# Patient Record
Sex: FEMALE | Race: BLACK OR AFRICAN AMERICAN | HISPANIC OR LATINO
[De-identification: names, ages, dates, MRNs, and addresses within clinical notes are randomized per-mention and may not be internally consistent; named-entity substitution may affect disease eponyms.]

---

## 2022-11-11 ENCOUNTER — TRANSCRIPTION ENCOUNTER (OUTPATIENT)
Age: 67
End: 2022-11-11

## 2022-11-11 ENCOUNTER — APPOINTMENT (OUTPATIENT)
Dept: AFTER HOURS CARE | Facility: EMERGENCY ROOM | Age: 67
End: 2022-11-11

## 2022-11-11 DIAGNOSIS — Z87.19 PERSONAL HISTORY OF OTHER DISEASES OF THE DIGESTIVE SYSTEM: ICD-10-CM

## 2022-11-11 DIAGNOSIS — Z86.39 PERSONAL HISTORY OF OTHER ENDOCRINE, NUTRITIONAL AND METABOLIC DISEASE: ICD-10-CM

## 2022-11-11 DIAGNOSIS — Z86.79 PERSONAL HISTORY OF OTHER DISEASES OF THE CIRCULATORY SYSTEM: ICD-10-CM

## 2022-11-11 DIAGNOSIS — Z98.61 CORONARY ANGIOPLASTY STATUS: ICD-10-CM

## 2022-11-11 DIAGNOSIS — Z87.898 PERSONAL HISTORY OF OTHER SPECIFIED CONDITIONS: ICD-10-CM

## 2022-11-11 DIAGNOSIS — U07.1 COVID-19: ICD-10-CM

## 2022-11-11 PROCEDURE — 99204 OFFICE O/P NEW MOD 45 MIN: CPT | Mod: 95

## 2022-11-11 RX ORDER — ACETAMINOPHEN 500 MG/1
500 CAPSULE, LIQUID FILLED ORAL 4 TIMES DAILY
Qty: 30 | Refills: 0 | Status: ACTIVE | COMMUNITY
Start: 2022-11-11 | End: 1900-01-01

## 2022-11-11 NOTE — PLAN
[With new medications prescribed] : Treat in place: with new medications prescribed [FreeTextEntry1] : COVID-19\par \par You were evaluated during your Telehealth visit for symptoms of the COVID-19 virus. A enrollment for the monoclonal antibody infusion, however, due to the short supply we cannot guarantee [facility] will be able to offer you an infusion at this time. [facility] representative will call you to schedule your infusion if available. You may take Tylenol or Motrin over-the-counter per package instructions as needed for fever or pain. Please seek medical attention if you develop worsening shortness of breath, vomiting, or you have any concerns.\par \par Information from [facility] on managing COVID-19 Symptoms:\par https://www.cdc.gov/coronavirus/2019-ncov/if-you-are-sick/steps-when-sick.html\par \par Information from [facility] on quarantine and isolation:\par https://www.cdc.gov/coronavirus/2019-ncov/your-health/quarantine-isolation.html?CDC_AA_refVal=https%3A%2F%2Fwww.cdc.gov%2Fcoronavirus%1A0818-pxid%2Fif-you-are-sick%2Fquarantine.html\par \par \par Phone number to antibody infusion scheduling center: [phone] ([address] [place] [address]) \par \par A prescription for Albuterol (an inhaler for cough) was sent to:\par \par \par A physician evaluated you to see if you are a candidate for the monoclonal antibody infusion today. [facility] uses very strict criteria to best determine who would receive the most benefit from monoclonal antibody infusion. According to your medical history and the information provided, you qualify for the monoclonal antibody infusion. A referral for the monoclonal antibody was placed. We recommend that you call the infusion team at [phone] ([phone]) to expedite scheduling. If you are unable to make an appointment for the infusion, please call us back at [phone] and we will help assist you.?-- YOU MUST BRING PROOF OF YOUR POSITIVE TEST TO THE INFUSION APPOINTMENT.?-- We recommend you follow the CDC recommendations for isolation?(https://www.cdc.gov/coronavirus/2019-ncov/your-health/quarantine-isolation.html).?-- Please use 650mg Tylenol (also called acetaminophen) every 4 hours & 600mg Motrin (also called Advil or ibuprofen) every 6 hours as needed for pain/discomfort/swelling. You can get these without a prescription. Don't use more than 3000mg of Tylenol in any 24-hour period. Make sure your other prescription/over-the-counter medications don't contain any Tylenol so you don't take too much. If you have any stomach discomfort while taking Motrin, you can use TUMS or Pepcid or Zantac (these can also be bought without a prescription).?-- Please  a fingertip pulse-ox detector. If the number is consistently < 94%, go to the ER.?-- Rest, increase your fluid intake and avoid dehydration & refrain from alcohol in excess.?-- It is very important to be diligent about hand washing & hygiene to avoid spreading the illness to others.?-- Please follow up with your doctor(s) within the next 2 days, but seek medical care sooner if your symptoms do not improve. Call for an appointment as soon as possible.?-- If you have worsening or concerning symptoms, especially shortness of breath or dehydration, see your doctor right away or go to the Emergency Department immediately.?-- Please continue taking your home medications as directed. Do not take extra doses of medication to make up for missed doses. Don't use alcohol when taking any medication (especially antibiotics, Tylenol or pain medication) unless you check with a doctor/pharmacist.?-- As soon as you recover and have no symptoms, please get a flu shot and complete the COVID vaccine series (if you get the monoclonal antibody infusion, wait 90 days before getting a COVID vaccine or booster shot).?-- Thanks for using the [facility] ([facility]). You can always call [facility] back at?[phone]. There is always an emergency physician available.

## 2022-11-11 NOTE — HISTORY OF PRESENT ILLNESS
[Home] : at home, [unfilled] , at the time of the visit. [Other Location: e.g. Home (Enter Location, City,State)___] : at [unfilled] [Verbal consent obtained from patient] : the patient, [unfilled] [FreeTextEntry8] : 62F presents COVID day #3, positive last night\par Sx: nasal congestion, fever, general headache, body aches, mild cough occasional\par No SOB. No N/V\par Vaccine status: COVID x4 (10/23/22) Pfizer\par PMH: Asthma, HTN, DM, CAD w/ stents, PAD, chron's, vertigo\par Medications: ASA, mesalamine, atorvastatin, brillanta, carvedilol, duloxetine, Humira, tentrum/novolog, latanoprost, losartan, pantoprozaole, riboflavine, Trulicity, vitamine D\par Allergies: NKDA, sensitive to Oxycodone/Tramdol/Percocet\par \par ([id number] [employer])\par

## 2022-11-11 NOTE — ASSESSMENT
[FreeTextEntry1] : COVID day #3. No respiratory distress. Tolerating PO. Significant risk factors for severe disease. Paxlovid contraindicated due to [place] which cannot be held due to coronary stents. Pt referred for MAB. Supportive care with Tylenol PRN and Mucinex discussed.[facility]). You can always call [facility] back at?[phone]. There is always an emergency physician available.

## 2022-11-14 ENCOUNTER — APPOINTMENT (OUTPATIENT)
Age: 67
End: 2022-11-14

## 2022-11-14 ENCOUNTER — EMERGENCY (EMERGENCY)
Facility: HOSPITAL | Age: 67
LOS: 1 days | Discharge: HOME | End: 2022-11-14
Admitting: EMERGENCY MEDICINE
Payer: MEDICARE

## 2022-11-14 VITALS
RESPIRATION RATE: 17 BRPM | DIASTOLIC BLOOD PRESSURE: 78 MMHG | OXYGEN SATURATION: 98 % | SYSTOLIC BLOOD PRESSURE: 149 MMHG | HEART RATE: 73 BPM | TEMPERATURE: 99 F

## 2022-11-14 VITALS — HEIGHT: 64 IN | WEIGHT: 166.01 LBS

## 2022-11-14 PROCEDURE — L9998: CPT

## 2022-11-14 RX ORDER — BEBTELOVIMAB 87.5 MG/ML
175 INJECTION, SOLUTION INTRAVENOUS ONCE
Refills: 0 | Status: COMPLETED | OUTPATIENT
Start: 2022-11-14 | End: 2022-11-14

## 2022-11-14 RX ADMIN — BEBTELOVIMAB 175 MILLIGRAM(S): 87.5 INJECTION, SOLUTION INTRAVENOUS at 09:15

## 2022-11-14 NOTE — CHART NOTE - NSCHARTNOTEFT_GEN_A_CORE
Monoclonal Antibody Infusion    Symptoms: congestion, cough, fever  High Risk Factors: age greater than 65   Allergies: tramadol-vomiting rash    Home Medications:   aspirin 81mg  atorvastatin 40mg daily   losartan 100mg daily   Brilinta 60mg daily   carvedilol 12.5mg daily   duloxetine 30mg   novalog insulin       Exam:   Appearance: NAD  HEENt: Normal oral mucosa  Lymphatic: No lymphadenopathy  Cardiovascular: Normal S1 S2, no acute cardiac distress noted  Respiratory: Lungs clear to auscultation   Gastrointestinal : soft, non-tender, + BS  Skin: warm and dry   Neurological: non-focal   Extremities: Normal range of motion    Vital Signs:   BP: 164/78, 153/83  Temp: 99.0  02 sat: 98  HR: 72    Assessment & Plan:   I have reviewed the Bebtelovimab infusion Emergency Use Authorization and I have provided the patients or the patient's caregiver with the following information:   1.) FDA has authorized emergency use Bebtelovimab, which is not an FDA-approved biological product  2.)The patients or patient's caregiver has the option to accept or refuse administration of Bebtelovimab  3.) The significant known and potential risks and benefits of Bebtelovimab and the extent to which such risks and benefits are unknown  4.) Information on available alternative  treatments and risks and benefits of those alternatives    67 years old    Allergies: tramadol    Pt tested positive for COVID-19 on 11/9/22    Symptoms=congestion, cough, fever    COVID-19 Vaccine=Pfizer x4    PMH= HTN, diabetes, anxiety    PLAN:   Infusion procedure explained to patient-consent for monoclonal antibody infusion obtained- risks and benefits discussed/all questions answered -infuse Bebtelovimab IV 1x IV push-observe patient for one hour post infusion and discharge home      Consent form thoroughly reviewed and signed     All questions answered    Pt is okay with plan    Pt tolerated MAB infusion well    No acute distress, noted    Pt advised to follow-up with PCP soon    ER percautions if symptoms worsen Monoclonal Antibody Infusion    Symptoms: congestion, cough, fever. Pt denies any CP, SOB.   High Risk Factors: age greater than 65   Allergies: tramadol-vomiting rash    Home Medications:   aspirin 81mg  atorvastatin 40mg daily   losartan 100mg daily   Brilinta 60mg daily   carvedilol 12.5mg daily   duloxetine 30mg   novalog insulin       Exam:   Appearance: NAD  HEENt: Normal oral mucosa  Lymphatic: No lymphadenopathy  Cardiovascular: Normal S1 S2, no acute cardiac distress noted  Respiratory: Lungs clear to auscultation   Gastrointestinal : soft, non-tender, + BS  Skin: warm and dry   Neurological: non-focal   Extremities: Normal range of motion    Vital Signs:   BP: 164/78, 153/83  Temp: 99.0  02 sat: 98  HR: 72    Assessment & Plan:   I have reviewed the Bebtelovimab infusion Emergency Use Authorization and I have provided the patients or the patient's caregiver with the following information:   1.) FDA has authorized emergency use Bebtelovimab, which is not an FDA-approved biological product  2.)The patients or patient's caregiver has the option to accept or refuse administration of Bebtelovimab  3.) The significant known and potential risks and benefits of Bebtelovimab and the extent to which such risks and benefits are unknown  4.) Information on available alternative  treatments and risks and benefits of those alternatives    67 years old    Allergies: tramadol    Pt tested positive for COVID-19 on 11/9/22    Symptoms=congestion, cough, fever    COVID-19 Vaccine=Pfizer x4    PMH= HTN, diabetes, anxiety    PLAN:   Infusion procedure explained to patient-consent for monoclonal antibody infusion obtained- risks and benefits discussed/all questions answered -infuse Bebtelovimab IV 1x IV push-observe patient for one hour post infusion and discharge home. Pt also instructed to f/u w/ PCP for BP check.       Consent form thoroughly reviewed and signed     All questions answered    Pt is okay with plan    Pt tolerated MAB infusion well    No acute distress, noted    Pt advised to follow-up with PCP soon    ER precautions if symptoms worsen

## 2022-11-16 DIAGNOSIS — U07.1 COVID-19: ICD-10-CM

## 2022-11-16 DIAGNOSIS — F41.9 ANXIETY DISORDER, UNSPECIFIED: ICD-10-CM

## 2022-11-16 DIAGNOSIS — E11.9 TYPE 2 DIABETES MELLITUS WITHOUT COMPLICATIONS: ICD-10-CM

## 2022-11-16 DIAGNOSIS — Z28.311 PARTIALLY VACCINATED FOR COVID-19: ICD-10-CM

## 2022-11-16 DIAGNOSIS — I10 ESSENTIAL (PRIMARY) HYPERTENSION: ICD-10-CM

## 2022-11-16 DIAGNOSIS — R50.9 FEVER, UNSPECIFIED: ICD-10-CM

## 2022-11-16 DIAGNOSIS — Z88.5 ALLERGY STATUS TO NARCOTIC AGENT: ICD-10-CM

## 2023-01-11 ENCOUNTER — OUTPATIENT (OUTPATIENT)
Dept: OUTPATIENT SERVICES | Facility: HOSPITAL | Age: 68
LOS: 1 days | End: 2023-01-11
Payer: MEDICARE

## 2023-01-11 ENCOUNTER — TRANSCRIPTION ENCOUNTER (OUTPATIENT)
Age: 68
End: 2023-01-11

## 2023-01-11 VITALS
HEIGHT: 64 IN | RESPIRATION RATE: 18 BRPM | WEIGHT: 177.03 LBS | OXYGEN SATURATION: 100 % | TEMPERATURE: 98 F | DIASTOLIC BLOOD PRESSURE: 63 MMHG | HEART RATE: 69 BPM | SYSTOLIC BLOOD PRESSURE: 114 MMHG

## 2023-01-11 VITALS
SYSTOLIC BLOOD PRESSURE: 126 MMHG | OXYGEN SATURATION: 99 % | RESPIRATION RATE: 16 BRPM | HEART RATE: 71 BPM | DIASTOLIC BLOOD PRESSURE: 50 MMHG

## 2023-01-11 DIAGNOSIS — Z98.89 OTHER SPECIFIED POSTPROCEDURAL STATES: Chronic | ICD-10-CM

## 2023-01-11 DIAGNOSIS — Z98.51 TUBAL LIGATION STATUS: Chronic | ICD-10-CM

## 2023-01-11 DIAGNOSIS — I48.0 PAROXYSMAL ATRIAL FIBRILLATION: ICD-10-CM

## 2023-01-11 DIAGNOSIS — T82.599A OTHER MECHANICAL COMPLICATION OF UNSPECIFIED CARDIAC AND VASCULAR DEVICES AND IMPLANTS, INITIAL ENCOUNTER: Chronic | ICD-10-CM

## 2023-01-11 DIAGNOSIS — Z98.890 OTHER SPECIFIED POSTPROCEDURAL STATES: Chronic | ICD-10-CM

## 2023-01-11 DIAGNOSIS — Z98.49 CATARACT EXTRACTION STATUS, UNSPECIFIED EYE: Chronic | ICD-10-CM

## 2023-01-11 DIAGNOSIS — Z95.5 PRESENCE OF CORONARY ANGIOPLASTY IMPLANT AND GRAFT: Chronic | ICD-10-CM

## 2023-01-11 PROCEDURE — 33286 RMVL SUBQ CAR RHYTHM MNTR: CPT

## 2023-01-11 PROCEDURE — 82962 GLUCOSE BLOOD TEST: CPT

## 2023-01-11 RX ORDER — LOSARTAN POTASSIUM 100 MG/1
1 TABLET, FILM COATED ORAL
Qty: 0 | Refills: 0 | DISCHARGE

## 2023-01-11 RX ORDER — ATORVASTATIN CALCIUM 80 MG/1
1 TABLET, FILM COATED ORAL
Qty: 0 | Refills: 0 | DISCHARGE

## 2023-01-11 RX ORDER — TICAGRELOR 90 MG/1
1 TABLET ORAL
Qty: 0 | Refills: 0 | DISCHARGE

## 2023-01-11 RX ORDER — AMLODIPINE BESYLATE 2.5 MG/1
1 TABLET ORAL
Qty: 0 | Refills: 0 | DISCHARGE

## 2023-01-11 RX ORDER — LATANOPROST 0.05 MG/ML
1 SOLUTION/ DROPS OPHTHALMIC; TOPICAL
Qty: 0 | Refills: 0 | DISCHARGE

## 2023-01-11 RX ORDER — DULAGLUTIDE 4.5 MG/.5ML
0 INJECTION, SOLUTION SUBCUTANEOUS
Qty: 0 | Refills: 0 | DISCHARGE

## 2023-01-11 RX ORDER — PANTOPRAZOLE SODIUM 20 MG/1
1 TABLET, DELAYED RELEASE ORAL
Qty: 0 | Refills: 0 | DISCHARGE

## 2023-01-11 RX ORDER — DULOXETINE HYDROCHLORIDE 30 MG/1
1 CAPSULE, DELAYED RELEASE ORAL
Qty: 0 | Refills: 0 | DISCHARGE

## 2023-01-11 RX ORDER — ASPIRIN/CALCIUM CARB/MAGNESIUM 324 MG
1 TABLET ORAL
Qty: 0 | Refills: 0 | DISCHARGE

## 2023-01-11 RX ORDER — ADALIMUMAB 40MG/0.8ML
0 KIT SUBCUTANEOUS
Qty: 0 | Refills: 0 | DISCHARGE

## 2023-01-11 RX ORDER — CARVEDILOL PHOSPHATE 80 MG/1
1 CAPSULE, EXTENDED RELEASE ORAL
Qty: 0 | Refills: 0 | DISCHARGE

## 2023-01-11 NOTE — ASU DISCHARGE PLAN (ADULT/PEDIATRIC) - CARE PROVIDER_API CALL
Delores Fernandez)  Cardiac Electrophysiology; Cardiovascular Disease; Nuclear Cardiology  [address] [facility] [address]  [place] [address]  Phone: [phone]  Fax: [phone]  Follow Up Time:

## 2023-01-11 NOTE — H&P CARDIOLOGY - HISTORY OF PRESENT ILLNESS
68 y/o F with PMHx of HTN, GERD, HTN, HLD, T2DM, CAD - prior PCI, Crohns disease, mild CKD, p/w palpitations who had a ILR implanted in 11/2020 screening for pAfib, remote monitoring never demonstrated AF, and her ILR is end of life therefore  requesting for ILR explant.

## 2023-01-11 NOTE — PROGRESS NOTE ADULT - SUBJECTIVE AND OBJECTIVE BOX
EP Brief Operative Note    Diagnosis: palpitations  Procedure: ILR explant  Surgeon: Delores Fernandez M.D.  Findings: none  EBL: minimal  Specimens: none  Post-op Diagnosis: palpitations  Assistants: none      A/P) successful [facility] ILR explant, no acute complications    -d/c home  -f/u with Dr. Tyler Vazquez as scheduled  -f/u with me prn    Delores Fernandez M.D., [facility]  Cardiac Electrophysiology  [facility]  [address] [place] [address]  [email or web address]    office [phone]  pager [phone]

## 2023-01-11 NOTE — ASU DISCHARGE PLAN (ADULT/PEDIATRIC) - NS MD DC FALL RISK RISK
For information on Fall & Injury Prevention, visit: [email or web address] OR  [email or web address] OR  https://www.cdc.gov/steadi/patient.html

## 2023-12-01 NOTE — H&P CARDIOLOGY - PATIENT REFERRED TO
Last appointment: 8/9/23  Next appointment: 12/5/23  Previous refill encounter(s): 8/30/23 #90    Requested Prescriptions     Pending Prescriptions Disp Refills    zolpidem (AMBIEN) 5 MG tablet [Pharmacy Med Name: Zolpidem Tartrate 5 MG Oral Tablet] 90 tablet 0     Sig: TAKE 1 TABLET BY MOUTH NIGHTLY  AS NEEDED FOR SLEEP . MAX DAILY  AMOUNT: 1 TABLET         For Pharmacy Admin Tracking Only    Program: Medication Refill  CPA in place:    Recommendation Provided To:    Intervention Detail: New Rx: 1, reason: Patient Preference  Intervention Accepted By:   [place] Felix Closed?:    Time Spent (min): 5 Other (specify)